# Patient Record
Sex: MALE | Race: WHITE | URBAN - METROPOLITAN AREA
[De-identification: names, ages, dates, MRNs, and addresses within clinical notes are randomized per-mention and may not be internally consistent; named-entity substitution may affect disease eponyms.]

---

## 2024-04-24 ENCOUNTER — OFFICE VISIT (OUTPATIENT)
Dept: NEUROLOGY | Facility: CLINIC | Age: 29
End: 2024-04-24
Payer: OTHER GOVERNMENT

## 2024-04-24 VITALS
WEIGHT: 190 LBS | DIASTOLIC BLOOD PRESSURE: 78 MMHG | RESPIRATION RATE: 16 BRPM | HEART RATE: 67 BPM | SYSTOLIC BLOOD PRESSURE: 122 MMHG

## 2024-04-24 DIAGNOSIS — R42 DIZZINESS: Primary | ICD-10-CM

## 2024-04-24 DIAGNOSIS — H53.8 BLURRY VISION: ICD-10-CM

## 2024-04-24 DIAGNOSIS — R41.89 BRAIN FOG: ICD-10-CM

## 2024-04-24 DIAGNOSIS — Z87.820 HISTORY OF CONCUSSION: ICD-10-CM

## 2024-04-24 DIAGNOSIS — H93.19 TINNITUS, UNSPECIFIED LATERALITY: ICD-10-CM

## 2024-04-24 NOTE — PROGRESS NOTES
Pt is here due to having 3 concussions most recent 2019 states he has been having blurry vision, dizziness, and ringing in the ears for about 1-2 years on and off

## 2024-04-24 NOTE — PROGRESS NOTES
[facility]    Date of consult: 4/24/2024    Assessment:    ICD-10-CM    1. Dizziness  R42 MRI BRAIN (CPT=40551) [[id number]]     EEG (At [facility])     Refer to ENT      2. Brain fog  R41.89 MRI BRAIN (CPT=40551) [[id number]]     EEG (At [facility])      3. Blurry vision  H53.8 MRI BRAIN (CPT=40551) [[id number]]     EEG (At [facility])      4. Tinnitus, unspecified laterality  H93.19 MRI BRAIN (CPT=40551) [[id number]]     EEG (At [facility])     Refer to ENT      5. History of concussion  Z87.820 MRI BRAIN (CPT=40551) [[id number]]     EEG (At [facility])        Plan:      Procedures    Refer to ENT    MRI BRAIN (CPT=40551) [[id number]]   EEG  Follow up with psychology therapist  PCP to follow  See orders and medications filed with this encounter. The patient indicates understanding of these issues and agrees with the plan.  Discussed with patient regarding assessment, work up, care plan, offered medication if needed, pt is not interested in medication at this time,  RTC after tests  Pt should go ER for any new or worsening symptoms and contact office     Subjective:   CC/Reason for consult: multiple neurological symptoms , h/o concussions    HPI: Junior Quintanilla is a 28 year old male with past medical history as listed below presents here for initial evaluation of multiple neurological symptoms , h/o concussions; Pt is here due to having 3 concussions most recent 2018~19 states he has been having blurry vision, brain fog, dizziness, and ringing in the ears for about 1-2 years on and off . He was in [place] now is in  based in [place] currently, states concussions were from hockey and training activities. Denies LOC.    History/Other:   REVIEW OF SYSTEMS:  A comprehensive 14-point system was reviewed. Pertinent positives and negatives are noted in HPI.     No current outpatient medications on file.  Allergies:  Allergies   Allergen Reactions    Penicillins Tightness  in Throat     Past Medical History:    Celiac disease (HCC)     History reviewed. No pertinent surgical history.  Social History:  Social History     Tobacco Use    Smoking status: Never    Smokeless tobacco: Never   Substance Use Topics    Alcohol use: Not Currently     Family history:  Patient denies any pertinent family history associated with the current problem    Objective:   Physical Examination:  /78   Pulse 67   Resp 16   Wt 190 lb (86.2 kg)   General: Awake and alert; in no acute distress  HEENT: Eye sclerae are anicteric; scalp is atraumatic  Neck: Supple  Cardiac: Regular rate and regular rhythm  Lungs: Clear   Abdomen:  non-tender  Extremities: No clubbing or cyanosis; moves extremities   Psychiatric: Normal mood and affect; answers questions appropriately  Dermatologic: No rashes; no edema  Neurological Examination:  Language: normal   Speech: no dysarthria  CN: II-XII intact  Motor strength: 5/5 all extremities  Tone: normal  DTRs: 2+ symmetric  Coordination: Normal FTN  Sensory: symmetric   Gait: nl    Data Reviewed on 4/24/2024  Notes Reviewed on 4/24/2024  Labs Reviewed  on 4/24/2024    Harry \"Milton\"MD Harry   Neurology  [facility]  4/24/2024, 12:41 PM  Consultation Report: being sent/fax/route to requesting provider   CC: No primary care provider on file.

## 2024-04-24 NOTE — PATIENT INSTRUCTIONS
Refill policies:    Allow 2-3 business days for refills; controlled substances may take longer.  Contact your pharmacy at least 5 days prior to running out of medication and have them send an electronic request or submit request through the “request refill” option in your [employer] account.  Refills are not addressed on weekends; covering physicians do not authorize routine medications on weekends.  No narcotics or controlled substances are refilled after noon on Fridays or by on call physicians.  By law, narcotics must be electronically prescribed.  A 30 day supply with no refills is the maximum allowed.  If your prescription is due for a refill, you may be due for a follow up appointment.  To best provide you care, patients receiving routine medications need to be seen at least once a year.  Patients receiving narcotic/controlled substance medications need to be seen at least once every 3 months.  In the event that your preferred pharmacy does not have the requested medication in stock (e.g. Backordered), it is your responsibility to find another pharmacy that has the requested medication available.  We will gladly send a new prescription to that pharmacy at your request.    Scheduling Tests:    If your physician has ordered radiology tests such as MRI or CT scans, please contact Central Scheduling at [phone] right away to schedule the test.  Once scheduled, the [facility] Centralized Referral Team will work with your insurance carrier to obtain pre-certification or prior authorization.  Depending on your insurance carrier, approval may take 3-10 days.  It is highly recommended patients assure they have received an authorization before having a test performed.  If test is done without insurance authorization, patient may be responsible for the entire amount billed.      Precertification and Prior Authorizations:  If your physician has recommended that you have a procedure or additional testing performed the [facility]  Centralized Referral Team will contact your insurance carrier to obtain pre-certification or prior authorization.    You are strongly encouraged to contact your insurance carrier to verify that your procedure/test has been approved and is a COVERED benefit.  Although the [facility] Centralized Referral Team does its due diligence, the insurance carrier gives the disclaimer that \"Although the procedure is authorized, this does not guarantee payment.\"    Ultimately the patient is responsible for payment.   Thank you for your understanding in this matter.  Paperwork Completion:  If you require FMLA or disability paperwork for your recovery, please make sure to either drop it off or have it faxed to our office at [phone]. Be sure the form has your name and date of birth on it.  The form will be faxed to our Forms Department and they will complete it for you.  There is a 25$ fee for all forms that need to be filled out.  Please be aware there is a 10-14 day turnaround time.  You will need to sign a release of information (RINA) form if your paperwork does not come with one.  You may call the Forms Department with any questions at [phone].  Their fax number is [phone].